# Patient Record
Sex: FEMALE | Employment: PART TIME | URBAN - METROPOLITAN AREA
[De-identification: names, ages, dates, MRNs, and addresses within clinical notes are randomized per-mention and may not be internally consistent; named-entity substitution may affect disease eponyms.]

---

## 2019-10-17 ENCOUNTER — TELEPHONE (OUTPATIENT)
Dept: OPHTHALMOLOGY | Facility: CLINIC | Age: 79
End: 2019-10-17

## 2019-10-17 NOTE — TELEPHONE ENCOUNTER
Referring MD spoke to providers and Dr Henry able to see tomorrow at 8 AM    Called pt at [phone]  Pt states would not be able to travel from [place] for 8 AM appt tomorrow and states seeing another eye provider in [place] tomorrow at 3 PM  Pt states Dr. Damico aware not seeing MD at [place]    Note to cornea team/Dr. Rusty Tripathi, RN RN 3:58 PM 10/17/19        [facility] Call Center    Phone Message    May a detailed message be left on voicemail: no    Reason for Call: Other: corneal ulcer possible fungal infection, referred Dr.Mark Damico, per rylee @ [facility]  asking to be put in our schedule in the next 24 hours    Action Taken: Message routed to:  [facility] ([facility] eye clinic